# Patient Record
Sex: FEMALE | Race: WHITE | NOT HISPANIC OR LATINO | URBAN - METROPOLITAN AREA
[De-identification: names, ages, dates, MRNs, and addresses within clinical notes are randomized per-mention and may not be internally consistent; named-entity substitution may affect disease eponyms.]

---

## 2025-04-16 ENCOUNTER — OFFICE VISIT (OUTPATIENT)
Dept: OBSTETRICS AND GYNECOLOGY | Facility: CLINIC | Age: 25
End: 2025-04-16
Payer: COMMERCIAL

## 2025-04-16 VITALS
DIASTOLIC BLOOD PRESSURE: 65 MMHG | HEIGHT: 69 IN | HEART RATE: 68 BPM | WEIGHT: 189.6 LBS | SYSTOLIC BLOOD PRESSURE: 99 MMHG | BODY MASS INDEX: 28.08 KG/M2

## 2025-04-16 DIAGNOSIS — Z01.419 ENCOUNTER FOR ANNUAL ROUTINE GYNECOLOGICAL EXAMINATION: Primary | ICD-10-CM

## 2025-04-16 PROCEDURE — 3008F BODY MASS INDEX DOCD: CPT | Performed by: OBSTETRICS & GYNECOLOGY

## 2025-04-16 PROCEDURE — 1036F TOBACCO NON-USER: CPT | Performed by: OBSTETRICS & GYNECOLOGY

## 2025-04-16 PROCEDURE — 99395 PREV VISIT EST AGE 18-39: CPT | Performed by: OBSTETRICS & GYNECOLOGY

## 2025-04-16 ASSESSMENT — PATIENT HEALTH QUESTIONNAIRE - PHQ9
2. FEELING DOWN, DEPRESSED OR HOPELESS: NOT AT ALL
SUM OF ALL RESPONSES TO PHQ9 QUESTIONS 1 & 2: 0
1. LITTLE INTEREST OR PLEASURE IN DOING THINGS: NOT AT ALL

## 2025-04-16 ASSESSMENT — ENCOUNTER SYMPTOMS
DEPRESSION: 0
LOSS OF SENSATION IN FEET: 0
OCCASIONAL FEELINGS OF UNSTEADINESS: 0

## 2025-04-16 ASSESSMENT — PAIN SCALES - GENERAL: PAINLEVEL_OUTOF10: 0-NO PAIN

## 2025-04-16 ASSESSMENT — LIFESTYLE VARIABLES
HOW MANY STANDARD DRINKS CONTAINING ALCOHOL DO YOU HAVE ON A TYPICAL DAY: PATIENT DOES NOT DRINK
AUDIT-C TOTAL SCORE: 0
HOW OFTEN DO YOU HAVE A DRINK CONTAINING ALCOHOL: NEVER
HOW OFTEN DO YOU HAVE SIX OR MORE DRINKS ON ONE OCCASION: NEVER
SKIP TO QUESTIONS 9-10: 1

## 2025-04-16 NOTE — PROGRESS NOTES
"                                                                                                                           ANNUAL GYNECOLOGIC VISIT    Subjective   HPI:  Gilma Carter is a 24 y.o. female  Patient's last menstrual period was 2025 (approximate). for annual exam.    Complaints:   none  Periods: regular   Dysmenorrhea:  none    GYNH:   Menarche  age  12  HPV Vaccine  no declines  Sexual activity yes  Current contraception   female partners  History of abnormal pap   nne    Last pap smear:   Result Date Procedure Results Follow-ups   2021 CONVERTED GYN CYTOLOGY CONVERTED FINAL DIAGNOSIS:   SPECIMEN ADEQUACY:       Satisfactory for evaluation.           Endocervical transformation zone component present.         GENERAL CATEGORIZATION:       Negative for intraepithelial lesion or malignancy.            See interpretation-result.          ...  CONVERTED COMMENT: The Pap test is only a screening test for cervical cancer. As with all  screening tests, false-negative results can occur, emphasizing the need  for ongoing surveillance and clinical correlation. If there are any  questions about the results of this scre...  CONVERTED CLINICAL CYTOLOGY HISTORY: CLINICAL HISTORY:  Date of Last Menstrual Period: 21  Reflex High Risk HPV Testing If ASCUS          OB History          0    Para   0    Term   0       0    AB   0    Living   0         SAB   0    IAB   0    Ectopic   0    Multiple   0    Live Births   0                    Medical History[1]    Surgical History[2]    Prior to Admission medications    Not on File       Social History[3]     Objective   BP 99/65   Pulse 68   Ht 1.752 m (5' 8.98\")   Wt 86 kg (189 lb 9.6 oz)   LMP 2025 (Approximate)   BMI 28.02 kg/m²        General:   Alert and oriented, in no acute distress   Neck: Supple. No visible thyromegaly.    Breast/Axilla: Normal to palpation bilaterally without masses, skin changes, or nipple " discharge.    Abdomen: Soft, non-tender, without masses or organomegaly   Vulva: Normal architecture without erythema, masses, or lesions.    Vagina: Normal mucosa without lesions, masses, or atrophy. No abnormal vaginal discharge.    Cervix: Normal without masses, lesions, or signs of cervicitis   Uterus: Normal, mobile, non-enlarged uterus   Adnexa: No palpable masses or tendernes   Pelvic Floor normal   Psych Normal affect. Normal mood.      Assessment/Plan   Problem List Items Addressed This Visit    None  Visit Diagnoses         Codes      Encounter for annual routine gynecological examination    -  Primary Z01.419    Relevant Orders    THINPREP PAP          Routine annual  Pap due today    STI screening as indicated  Birth control counseling: Barrier methods of contraception for prevention of STIs.   Preventative hygiene habits.  Health promoting habits: Exercise: 30-60 minutes 3 to 5 days/week. Diet: Healthy diet and drink plenty of water each day.  Return in 1 year for routine GYN exam or sooner as clinically indicated.    Cole Powers MD            [1] History reviewed. No pertinent past medical history.  [2]   Past Surgical History:  Procedure Laterality Date    MR ARTHROGRAM SHOULDER RIGHT W FL GUIDED INJECTION Right 5/5/2015    MR SHOULDER ARTHROGRAM RIGHT W FL GUIDED INJECTION [facility]   [3]   Social History  Tobacco Use    Smoking status: Never    Smokeless tobacco: Never   Vaping Use    Vaping status: Never Used   Substance Use Topics    Alcohol use: Never    Drug use: Never

## 2025-05-01 LAB
CYTOLOGY CMNT CVX/VAG CYTO-IMP: NORMAL
LAB AP HPV GENOTYPE QUESTION: YES
LAB AP HPV HR: NORMAL
LABORATORY COMMENT REPORT: NORMAL
LMP START DATE: NORMAL
PATH REPORT.TOTAL CANCER: NORMAL
RESIDENT REVIEW: NORMAL